# Patient Record
Sex: MALE | Race: WHITE | NOT HISPANIC OR LATINO | Employment: OTHER | URBAN - NONMETROPOLITAN AREA
[De-identification: names, ages, dates, MRNs, and addresses within clinical notes are randomized per-mention and may not be internally consistent; named-entity substitution may affect disease eponyms.]

---

## 2024-09-30 DIAGNOSIS — H91.93 DECREASED HEARING OF BOTH EARS: Primary | ICD-10-CM

## 2024-10-07 ENCOUNTER — OFFICE VISIT (OUTPATIENT)
Dept: OTOLARYNGOLOGY | Facility: OTHER | Age: 29
End: 2024-10-07
Attending: AUDIOLOGIST
Payer: COMMERCIAL

## 2024-10-07 ENCOUNTER — OFFICE VISIT (OUTPATIENT)
Dept: AUDIOLOGY | Facility: OTHER | Age: 29
End: 2024-10-07
Attending: AUDIOLOGIST
Payer: COMMERCIAL

## 2024-10-07 VITALS
HEART RATE: 113 BPM | WEIGHT: 315 LBS | BODY MASS INDEX: 41.75 KG/M2 | HEIGHT: 73 IN | DIASTOLIC BLOOD PRESSURE: 88 MMHG | TEMPERATURE: 98 F | OXYGEN SATURATION: 97 % | SYSTOLIC BLOOD PRESSURE: 154 MMHG

## 2024-10-07 DIAGNOSIS — R51.9 NONINTRACTABLE HEADACHE, UNSPECIFIED CHRONICITY PATTERN, UNSPECIFIED HEADACHE TYPE: ICD-10-CM

## 2024-10-07 DIAGNOSIS — H92.03 OTALGIA, BILATERAL: ICD-10-CM

## 2024-10-07 DIAGNOSIS — S16.1XXA STRAIN OF NECK MUSCLE, INITIAL ENCOUNTER: ICD-10-CM

## 2024-10-07 DIAGNOSIS — J30.2 SEASONAL ALLERGIC RHINITIS, UNSPECIFIED TRIGGER: Primary | ICD-10-CM

## 2024-10-07 DIAGNOSIS — H69.93 DYSFUNCTION OF EUSTACHIAN TUBE, BILATERAL: Primary | ICD-10-CM

## 2024-10-07 DIAGNOSIS — H91.93 DECREASED HEARING OF BOTH EARS: ICD-10-CM

## 2024-10-07 PROCEDURE — 99203 OFFICE O/P NEW LOW 30 MIN: CPT | Mod: 25 | Performed by: NURSE PRACTITIONER

## 2024-10-07 PROCEDURE — 31231 NASAL ENDOSCOPY DX: CPT | Performed by: NURSE PRACTITIONER

## 2024-10-07 PROCEDURE — 92550 TYMPANOMETRY & REFLEX THRESH: CPT | Performed by: AUDIOLOGIST

## 2024-10-07 PROCEDURE — G0463 HOSPITAL OUTPT CLINIC VISIT: HCPCS | Mod: 25 | Performed by: NURSE PRACTITIONER

## 2024-10-07 PROCEDURE — 92504 EAR MICROSCOPY EXAMINATION: CPT | Performed by: NURSE PRACTITIONER

## 2024-10-07 PROCEDURE — 31231 NASAL ENDOSCOPY DX: CPT | Mod: 52 | Performed by: NURSE PRACTITIONER

## 2024-10-07 PROCEDURE — 86003 ALLG SPEC IGE CRUDE XTRC EA: CPT | Mod: ZL | Performed by: NURSE PRACTITIONER

## 2024-10-07 PROCEDURE — 92557 COMPREHENSIVE HEARING TEST: CPT | Performed by: AUDIOLOGIST

## 2024-10-07 PROCEDURE — 36415 COLL VENOUS BLD VENIPUNCTURE: CPT | Mod: ZL | Performed by: NURSE PRACTITIONER

## 2024-10-07 PROCEDURE — 82785 ASSAY OF IGE: CPT | Mod: ZL | Performed by: NURSE PRACTITIONER

## 2024-10-07 RX ORDER — PSEUDOEPHEDRINE HCL 120 MG/1
TABLET, FILM COATED, EXTENDED RELEASE ORAL
COMMUNITY

## 2024-10-07 RX ORDER — LORATADINE 10 MG/1
TABLET ORAL
COMMUNITY

## 2024-10-07 RX ORDER — MONTELUKAST SODIUM 10 MG/1
1 TABLET ORAL AT BEDTIME
COMMUNITY

## 2024-10-07 RX ORDER — BUPROPION HYDROCHLORIDE 150 MG/1
TABLET ORAL
COMMUNITY
Start: 2024-03-15

## 2024-10-07 RX ORDER — TRIAMCINOLONE ACETONIDE 55 UG/1
SPRAY, METERED NASAL
COMMUNITY

## 2024-10-07 RX ORDER — PHENOL 1.4 %
AEROSOL, SPRAY (ML) MUCOUS MEMBRANE
COMMUNITY

## 2024-10-07 ASSESSMENT — PAIN SCALES - GENERAL: PAINLEVEL: SEVERE PAIN (7)

## 2024-10-07 NOTE — PROGRESS NOTES
Otolaryngology Note         Chief Complaint:     Patient presents with:  Hearing Problem: HEA/ ETD           History of Present Illness:     Eyal Miller is a 28 year old male seen today for concerns for ear pain.  Symptoms have been present most of the summer.      He was treated with abx for OM with some improvement.    He notes a history of allergies in the past, worst in the summer, typcially able to treat with OTC AH    He is currently taking claritin, cetrizine, nasacort singulair.  He has not noted any improvement  He continues with ear pain.  Typically dull, doesn't bother him much  His major complaint is feeling fatigued.      No fevers or chills  He is able to breath through his nose  He gets frequent epistaxis, uses tissue in his nose  No worsening with use of nasacort spray  Gets epistaxis from blowing crusted stuff out of his nose  Uses vaseline at times     He has trouble hearing on the phone at times, this is usually when his allergies are bad  Previous allergy testing for food allergies.  No major findings.      No known family history of ENT issues  + family history of allergies, father and brother  Never smoker   No heavy etoh  Patient has no history of otological surgeries or procedures  No history of noise exposure, some loud music   No rotary  vertigo, facial numbness or tingling.    Occasional mild tinnitus,usually worse after loud noises  Seems to be more sensitive to noises    No diagnosed history of migraines however he does have headaches with blur in vision, light sensitivty and sounds sensitivity  + family history of migraines  Both ears hurt to the same severity.    Does not feel that he clenches or grinds           Medications:     Current Outpatient Rx   Medication Sig Dispense Refill    buPROPion (WELLBUTRIN XL) 150 MG 24 hr tablet TAKE 1 TABLET BY MOUTH ONE TIME A DAY FOR 14 DAYS. DO NOT CRUSH. INDICATIONS ATTENTION DEFICIT HYPERACTIVITY DISORDER      loratadine (CLARITIN) 10 MG  "tablet       Melatonin 10 MG TABS tablet       montelukast (SINGULAIR) 10 MG tablet Take 1 tablet by mouth at bedtime.      omeprazole (PRILOSEC) 20 MG DR capsule TAKE 1 CAPSULE BY MOUTH ONE DAILY DO NOT CRUSH; USE FOR HEARTBURN/ACID REFLUX      Pediatric Multivitamins-Fl (MULTIVITAMIN + FLUORIDE) 0.5 MG CHEW       pseudoePHEDrine (SUDAFED) 120 MG 12 hr tablet TAKE 1 TABLET BY MOUTH EVERY 12 HOURS AS NEEDED FOR CONGESTION. DO NOT CRUSH.      triamcinolone (NASACORT) 55 MCG/ACT nasal aerosol INSTILL 2 SPRAYS IN EACH NOSTRIL ONCE DAILY              Allergies:     Allergies: Penicillins          Past Medical History:     No past medical history on file.         Past Surgical History:     No past surgical history on file.    ENT family history reviewed         Social History:     Social History     Tobacco Use    Smoking status: Never    Smokeless tobacco: Never   Substance Use Topics    Alcohol use: Yes     Alcohol/week: 3.0 standard drinks of alcohol     Types: 3 Standard drinks or equivalent per week    Drug use: Never            Review of Systems:     ROS: See HPI         Physical Exam:     BP (!) 154/88 (BP Location: Right arm, Patient Position: Sitting, Cuff Size: Adult Large)   Pulse 113   Temp 98  F (36.7  C) (Tympanic)   Ht 1.854 m (6' 1\")   Wt (!) 172.4 kg (380 lb)   SpO2 97%   BMI 50.13 kg/m      General - The patient is well nourished and well developed, and appears to have good nutritional status.  Alert and oriented to person and place, answers questions and cooperates with examination appropriately.   Head and Face - Normocephalic and atraumatic, with no gross asymmetry noted.  The facial nerve is intact, with strong symmetric movements.  Voice and Breathing - The patient was breathing comfortably without the use of accessory muscles. There was no wheezing, stridor. The patients voice was clear and strong, and had appropriate pitch and quality.  Ears - External ear normal.  The ears were examined " under binocular microscopy and with otoscope.  Bilateral canals are patent, bilateral tympanic membranes are intact with out effusion or retraction   Eyes - Extraocular movements intact, sclera were not icteric or injected, conjunctiva were pink and moist.  Mouth - Examination of the oral cavity showed pink, healthy oral mucosa. Dentition in good condition. No lesions or ulcerations noted. The tongue was mobile and midline.   Throat - The walls of the oropharynx were smooth, pink, moist, symmetric, and had no lesions or ulcerations.  The tonsillar pillars and soft palate were symmetric. The uvula was midline on elevation.    Neck - Thick neck limits exam, dowager's hump, tenderness to palpation of bilateral trapezius muscles up to posterior ears and jaw.  No worrisome palpable lymphadenopathy.   Nose - External contour is symmetric, no gross deflection or scars.  Nasal mucosa is pink and moist with no abnormal mucus.  The septum and turbinates were evaluated with nasal speculum, no polyps, masses, or purulence noted on examination.    To evaluate the nose and sinuses, I performed rigid nasal endoscopy.  I sprayed both nares with 2 sprays lidocaine and neosynephrine.     I began with the RIGHT side using a 0 degree rigid nasal endoscope, and then similarly examined the LEFT side     Findings:   Inferior turbinates: bilateral IT's are enlarged, pale and boggy  Middle turbinate and middle meatus: unremarkable  The superior meatus is examined and unremarkable   No SER purulence or polypoid change  NP is clear, no masses, ETs are patent  The patient tolerated the procedure well              Assessment and Plan:       ICD-10-CM    1. Seasonal allergic rhinitis, unspecified trigger  J30.2 lidocaine 2%-oxymetazoline 0.025% nasal solution 2 spray     [place]halent Panel [place] ([facility]olab)     Total IgE (Serolab)     Inhalent Panel [place] (Serolab)     Total IgE (Serolab)      2. Nonintractable headache, unspecified chronicity  pattern, unspecified headache type  R51.9       3. Otalgia, bilateral  H92.03       4. Strain of neck muscle, initial encounter  S16.1XXA         Ordered Inhalent Allergy Panel with total IgE, go to lab today to have that drawn.  Nurse will call you with results and recommendations     Recommend neck stretches  Recommend to see a Chiropractor    Consider stopping Singulair  if fatigue persists  Recommend seeing primary care provider for a complete physical to rule out other sources of fatigue.     Nadia Sanchez NP-C  [facility]

## 2024-10-07 NOTE — LETTER
10/7/2024      Eyal Miller  [address]  [facility] [address]      Dear Colleague,    Thank you for referring your patient, Eyal Miller, to the [facility]. Please see a copy of my visit note below.    Otolaryngology Note         Chief Complaint:     Patient presents with:  Hearing Problem: HEA/ ETD           History of Present Illness:     Eyal Miller is a 28 year old male seen today for concerns for ear pain.  Symptoms have been present most of the summer.      He was treated with abx for OM with some improvement.    He notes a history of allergies in the past, worst in the summer, typcially able to treat with OTC AH    He is currently taking claritin, cetrizine, nasacort singulair.  He has not noted any improvement  He continues with ear pain.  Typically dull, doesn't bother him much  His major complaint is feeling fatigued.      No fevers or chills  He is able to breath through his nose  He gets frequent epistaxis, uses tissue in his nose  No worsening with use of nasacort spray  Gets epistaxis from blowing crusted stuff out of his nose  Uses vaseline at times     He has trouble hearing on the phone at times, this is usually when his allergies are bad  Previous allergy testing for food allergies.  No major findings.      No known family history of ENT issues  + family history of allergies, father and brother  Never smoker   No heavy etoh  Patient has no history of otological surgeries or procedures  No history of noise exposure, some loud music   No rotary  vertigo, facial numbness or tingling.    Occasional mild tinnitus,usually worse after loud noises  Seems to be more sensitive to noises    No diagnosed history of migraines however he does have headaches with blur in vision, light sensitivty and sounds sensitivity  + family history of migraines  Both ears hurt to the same severity.    Does not feel that he clenches or grinds           Medications:     Current Outpatient Rx  "  Medication Sig Dispense Refill     buPROPion (WELLBUTRIN XL) 150 MG 24 hr tablet TAKE 1 TABLET BY MOUTH ONE TIME A DAY FOR 14 DAYS. DO NOT CRUSH. INDICATIONS ATTENTION DEFICIT HYPERACTIVITY DISORDER       loratadine (CLARITIN) 10 MG tablet        Melatonin 10 MG TABS tablet        montelukast (SINGULAIR) 10 MG tablet Take 1 tablet by mouth at bedtime.       omeprazole (PRILOSEC) 20 MG DR capsule TAKE 1 CAPSULE BY MOUTH ONE DAILY DO NOT CRUSH; USE FOR HEARTBURN/ACID REFLUX       Pediatric Multivitamins-Fl (MULTIVITAMIN + FLUORIDE) 0.5 MG CHEW        pseudoePHEDrine (SUDAFED) 120 MG 12 hr tablet TAKE 1 TABLET BY MOUTH EVERY 12 HOURS AS NEEDED FOR CONGESTION. DO NOT CRUSH.       triamcinolone (NASACORT) 55 MCG/ACT nasal aerosol INSTILL 2 SPRAYS IN EACH NOSTRIL ONCE DAILY              Allergies:     Allergies: Penicillins          Past Medical History:     No past medical history on file.         Past Surgical History:     No past surgical history on file.    ENT family history reviewed         Social History:     Social History     Tobacco Use     Smoking status: Never     Smokeless tobacco: Never   Substance Use Topics     Alcohol use: Yes     Alcohol/week: 3.0 standard drinks of alcohol     Types: 3 Standard drinks or equivalent per week     Drug use: Never            Review of Systems:     ROS: See HPI         Physical Exam:     BP (!) 154/88 (BP Location: Right arm, Patient Position: Sitting, Cuff Size: Adult Large)   Pulse 113   Temp 98  F (36.7  C) (Tympanic)   Ht 1.854 m (6' 1\")   Wt (!) 172.4 kg (380 lb)   SpO2 97%   BMI 50.13 kg/m      General - The patient is well nourished and well developed, and appears to have good nutritional status.  Alert and oriented to person and place, answers questions and cooperates with examination appropriately.   Head and Face - Normocephalic and atraumatic, with no gross asymmetry noted.  The facial nerve is intact, with strong symmetric movements.  Voice and Breathing " - The patient was breathing comfortably without the use of accessory muscles. There was no wheezing, stridor. The patients voice was clear and strong, and had appropriate pitch and quality.  Ears - External ear normal.  The ears were examined under binocular microscopy and with otoscope.  Bilateral canals are patent, bilateral tympanic membranes are intact with out effusion or retraction   Eyes - Extraocular movements intact, sclera were not icteric or injected, conjunctiva were pink and moist.  Mouth - Examination of the oral cavity showed pink, healthy oral mucosa. Dentition in good condition. No lesions or ulcerations noted. The tongue was mobile and midline.   Throat - The walls of the oropharynx were smooth, pink, moist, symmetric, and had no lesions or ulcerations.  The tonsillar pillars and soft palate were symmetric. The uvula was midline on elevation.    Neck - Thick neck limits exam, dowager's hump, tenderness to palpation of bilateral trapezius muscles up to posterior ears and jaw.  No worrisome palpable lymphadenopathy.   Nose - External contour is symmetric, no gross deflection or scars.  Nasal mucosa is pink and moist with no abnormal mucus.  The septum and turbinates were evaluated with nasal speculum, no polyps, masses, or purulence noted on examination.    To evaluate the nose and sinuses, I performed rigid nasal endoscopy.  I sprayed both nares with 2 sprays lidocaine and neosynephrine.     I began with the RIGHT side using a 0 degree rigid nasal endoscope, and then similarly examined the LEFT side     Findings:   Inferior turbinates: bilateral IT's are enlarged, pale and boggy  Middle turbinate and middle meatus: unremarkable  The superior meatus is examined and unremarkable   No SER purulence or polypoid change  NP is clear, no masses, ETs are patent  The patient tolerated the procedure well              Assessment and Plan:       ICD-10-CM    1. Seasonal allergic rhinitis, unspecified trigger   J30.2 lidocaine 2%-oxymetazoline 0.025% nasal solution 2 spray     [place] ([facility]ab)     Total IgE (Serolab)     [place]halent [place] ([facility]olab)     Total IgE (Serolab)      2. Nonintractable headache, unspecified chronicity pattern, unspecified headache type  R51.9       3. Otalgia, bilateral  H92.03       4. Strain of neck muscle, initial encounter  S16.1XXA         Ordered Inhalent Allergy Panel with total IgE, go to lab today to have that drawn.  Nurse will call you with results and recommendations     Recommend neck stretches  Recommend to see a Chiropractor    Consider stopping Singulair  if fatigue persists  Recommend seeing primary care provider for a complete physical to rule out other sources of fatigue.     Nadia MORAN  [facility]      Again, thank you for allowing me to participate in the care of your patient.        Sincerely,        Nadia Sanchez NP

## 2024-10-07 NOTE — PROGRESS NOTES
Audiology Evaluation Completed. Please refer SCANNED AUDIOGRAM and/or TYMPANOGRAM for BACKGROUND, RESULTS, RECOMMENDATIONS.      Bernie NOLASCO, [facility]  Audiologist #[id number]

## 2024-10-07 NOTE — PATIENT INSTRUCTIONS
Thank you for allowing Nadia Laura and our ENT team to participate in your care.  If your medications are too expensive, please give the nurse a call.  We can possibly change this medication.  If you have a scheduling or an appointment question please contact our Health Unit Coordinator at their direct line [phone] ext [phone].   ALL nursing questions or concerns can be directed to your ENT nurse at: [phone] - [facility]     Ordered Inhalent Allergy Panel with total IgE, go to lab today to have that drawn.  Nurse will call you with results and recommendations     Recommend neck stretches  Recommend to see a Chiropractor    Consider stopping Singulair  if fatigue persists    Recommend seeing primary care provider for a complete work up hide I saw him sorry him, correct by

## 2024-10-28 LAB
SCANNED LAB RESULT: NORMAL
SCANNED LAB RESULT: NORMAL

## 2024-12-15 ENCOUNTER — HEALTH MAINTENANCE LETTER (OUTPATIENT)
Age: 29
End: 2024-12-15